# Patient Record
Sex: MALE | Race: WHITE | Employment: FULL TIME | URBAN - METROPOLITAN AREA
[De-identification: names, ages, dates, MRNs, and addresses within clinical notes are randomized per-mention and may not be internally consistent; named-entity substitution may affect disease eponyms.]

---

## 2019-05-31 PROBLEM — E55.9 VITAMIN D DEFICIENCY: Status: ACTIVE | Noted: 2019-05-31

## 2019-06-12 PROBLEM — M10.071 ACUTE IDIOPATHIC GOUT OF RIGHT FOOT: Status: ACTIVE | Noted: 2019-06-12

## 2020-06-19 ENCOUNTER — LAB ENCOUNTER (OUTPATIENT)
Dept: LAB | Facility: HOSPITAL | Age: 55
End: 2020-06-19
Attending: INTERNAL MEDICINE
Payer: COMMERCIAL

## 2020-06-19 DIAGNOSIS — K63.5 COLON POLYPS: ICD-10-CM

## 2020-06-21 ENCOUNTER — ANESTHESIA EVENT (OUTPATIENT)
Dept: ENDOSCOPY | Facility: HOSPITAL | Age: 55
End: 2020-06-21
Payer: COMMERCIAL

## 2020-06-21 NOTE — ANESTHESIA PREPROCEDURE EVALUATION
PRE-OP EVALUATION    Patient Name: Castro Bombard    Pre-op Diagnosis: Family history of colon cancer in father [Z80.0]  History of colon polyps [Z86.010]  RUQ pain [R10.11]    Procedure(s):  COLONOSCOPY, ESOPHAGOGASTRODUODENOSCOPY, ENDOSCOPIC ULTRASOUND Binge frequency: Never      Drug use: Unknown     Available pre-op labs reviewed.                Airway      Mallampati: II  Mouth opening: >3 FB  TM distance: > 6 cm  Neck ROM: full Cardiovascular    Cardiovascular exam normal.         Dental    No notable

## 2020-06-22 ENCOUNTER — HOSPITAL ENCOUNTER (OUTPATIENT)
Facility: HOSPITAL | Age: 55
Setting detail: HOSPITAL OUTPATIENT SURGERY
Discharge: HOME | End: 2020-06-22
Attending: INTERNAL MEDICINE | Admitting: INTERNAL MEDICINE
Payer: COMMERCIAL

## 2020-06-22 ENCOUNTER — ANESTHESIA (OUTPATIENT)
Dept: ENDOSCOPY | Facility: HOSPITAL | Age: 55
End: 2020-06-22
Payer: COMMERCIAL

## 2020-06-22 VITALS
HEIGHT: 69 IN | SYSTOLIC BLOOD PRESSURE: 130 MMHG | WEIGHT: 175 LBS | BODY MASS INDEX: 25.92 KG/M2 | OXYGEN SATURATION: 99 % | HEART RATE: 47 BPM | TEMPERATURE: 97 F | DIASTOLIC BLOOD PRESSURE: 70 MMHG | RESPIRATION RATE: 17 BRPM

## 2020-06-22 DIAGNOSIS — K63.5 COLON POLYPS: Primary | ICD-10-CM

## 2020-06-22 DIAGNOSIS — Z86.010 HISTORY OF COLON POLYPS: ICD-10-CM

## 2020-06-22 DIAGNOSIS — Z80.0 FAMILY HISTORY OF COLON CANCER IN FATHER: ICD-10-CM

## 2020-06-22 DIAGNOSIS — R10.11 RUQ PAIN: ICD-10-CM

## 2020-06-22 PROCEDURE — 0DB98ZX EXCISION OF DUODENUM, VIA NATURAL OR ARTIFICIAL OPENING ENDOSCOPIC, DIAGNOSTIC: ICD-10-PCS | Performed by: INTERNAL MEDICINE

## 2020-06-22 PROCEDURE — 0DJD8ZZ INSPECTION OF LOWER INTESTINAL TRACT, VIA NATURAL OR ARTIFICIAL OPENING ENDOSCOPIC: ICD-10-PCS | Performed by: INTERNAL MEDICINE

## 2020-06-22 PROCEDURE — 0DB48ZX EXCISION OF ESOPHAGOGASTRIC JUNCTION, VIA NATURAL OR ARTIFICIAL OPENING ENDOSCOPIC, DIAGNOSTIC: ICD-10-PCS | Performed by: INTERNAL MEDICINE

## 2020-06-22 PROCEDURE — 0DJ08ZZ INSPECTION OF UPPER INTESTINAL TRACT, VIA NATURAL OR ARTIFICIAL OPENING ENDOSCOPIC: ICD-10-PCS | Performed by: INTERNAL MEDICINE

## 2020-06-22 PROCEDURE — 0DB68ZX EXCISION OF STOMACH, VIA NATURAL OR ARTIFICIAL OPENING ENDOSCOPIC, DIAGNOSTIC: ICD-10-PCS | Performed by: INTERNAL MEDICINE

## 2020-06-22 PROCEDURE — 88305 TISSUE EXAM BY PATHOLOGIST: CPT | Performed by: INTERNAL MEDICINE

## 2020-06-22 RX ORDER — SODIUM CHLORIDE, SODIUM LACTATE, POTASSIUM CHLORIDE, CALCIUM CHLORIDE 600; 310; 30; 20 MG/100ML; MG/100ML; MG/100ML; MG/100ML
INJECTION, SOLUTION INTRAVENOUS CONTINUOUS
Status: DISCONTINUED | OUTPATIENT
Start: 2020-06-22 | End: 2020-06-22

## 2020-06-22 RX ORDER — LIDOCAINE HYDROCHLORIDE 10 MG/ML
INJECTION, SOLUTION EPIDURAL; INFILTRATION; INTRACAUDAL; PERINEURAL AS NEEDED
Status: DISCONTINUED | OUTPATIENT
Start: 2020-06-22 | End: 2020-06-22 | Stop reason: SURG

## 2020-06-22 RX ADMIN — LIDOCAINE HYDROCHLORIDE 100 MG: 10 INJECTION, SOLUTION EPIDURAL; INFILTRATION; INTRACAUDAL; PERINEURAL at 09:32:00

## 2020-06-22 NOTE — H&P
[address] [facility] Department of  Gastroenterology  Update Health History :       Castro Bombard  male   Daria Car MD     [id number]  5/18/1965 Primary Care Physician  Umesh Amato DO        HPI :  RUQ pain.   Symptoms intermittently for some time n (Other) Father    • Other (Other) Mother         subdural hemorrhage, aneurysm s   • Other (Other) Maternal Grandmother         alzheimers   • Heart Disorder Maternal Grandfather         ami at age 48   • Cancer Paternal Grandmother    • Heart Disorder Ronda Pedro Shayla Moeller MD

## 2020-06-22 NOTE — OPERATIVE REPORT
PATIENT NAME: Elba Magana  MRN: [id number]  DATE OF OPERATION: 6/22/2020  PREOPERATIVE DIAGNOSIS:   1. Right upper quadrant abdominal pain.   His liver function tests were normal.  Transabdominal ultrasound showed fatty liver changes without evidence of ga was patent. There were no endoscopic features of eosinophilic esophagitis or Hernandez's esophagus. At the level of the GE junction at approximately 3 o'clock position there was slightly prominent gastric fold at the top part of it.   Biopsies were taken fro was normal with no polyps or masses seen. The colonoscope was then withdrawn and the mucosa was further carefully inspected. Of note the distal end Endo cuff detachable cap was fitted onto the tip of the scope around adduction the rectum.     No polyps o

## 2020-06-22 NOTE — ANESTHESIA POSTPROCEDURE EVALUATION
[facility]    Michelle Nowak Patient Status:  Hospital Outpatient Surgery   Age/Gender 50year old male MRN [id number]   Location [address] [facility]. Attending Stacie Gupta MD   Hosp Day # 0 PCP Hayley Warner DO       Anesthesia Post-op Note

## 2020-06-24 NOTE — PROGRESS NOTES
6/24/2020  Stewart Venegas  [address] [facility] [address] [id number]    Dear Eliseo Peralta,     Here are the biopsy/pathology findings from your recent EGD (upper endoscopy):    1. Duodenum biopsy: a normal biopsy of the small intestine.  Negative fo

## (undated) DEVICE — ENDOSCOPY PACK UPPER: Brand: MEDLINE INDUSTRIES, INC.

## (undated) DEVICE — ENDOCUFF ADULT GREEN

## (undated) DEVICE — ENDOSCOPY PACK - LOWER: Brand: MEDLINE INDUSTRIES, INC.

## (undated) DEVICE — FORCEP RADIAL JAW 4

## (undated) DEVICE — Device: Brand: DEFENDO AIR/WATER/SUCTION AND BIOPSY VALVE

## (undated) DEVICE — 3M™ RED DOT™ MONITORING ELECTRODE WITH FOAM TAPE AND STICKY GEL, 50/BAG, 20/CASE, 72/PLT 2570: Brand: RED DOT™

## (undated) DEVICE — MASK ETCO2 PANORAMIC

## (undated) DEVICE — 1200CC GUARDIAN II: Brand: GUARDIAN